# Patient Record
Sex: MALE | Race: WHITE | NOT HISPANIC OR LATINO | URBAN - METROPOLITAN AREA
[De-identification: names, ages, dates, MRNs, and addresses within clinical notes are randomized per-mention and may not be internally consistent; named-entity substitution may affect disease eponyms.]

---
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
## 2025-02-10 NOTE — ED PEDIATRIC NURSE REASSESSMENT NOTE - GASTROINTESTINAL ASSESSMENT
2/10/2025   Hakeem Delaney (: 1986) is a 38 y.o. male, New patient, here for evaluation of the following chief complaint(s):  Congestion (Pt c/o sinus congestion, vomiting, cough, headache this all started on Friday.)     ASSESSMENT/PLAN:  Below is the assessment and plan developed based on review of pertinent history, physical exam, labs, studies, and medications.       Assessment & Plan  COVID-19  The exam did not reveal an ill appearing patient, a toxic appearing patient, respiratory distress, rash or dehydration.  The exam did not reveal altered mental status, listlessness or lethargy.      The patient presents with clinical picture consistent with COVID.  There is no evidence for more malignant underlying problems such as pneumonia or sepsis.    The patient understands the possibility of COVID-associated complications including pneumonia and will follow up immediately with any worsening symptoms or changes.    The patient is a good candidate for outpatient therapy based on normal PO intake, reassuring exam with clear lungs on auscultation, normal oxygen saturations and lack of respiratory distress upon discharge.    Discharge decision based on the following:  clinical impression is consistent with outpatient treatment, patient's exam is stable and patient's condition is stable.    -Provided reassurance and reassessment  -Discussed over-the-counter medications for symptomatic relief  -Provided educational material and patient instructions  -Discharged patient with instructions to follow up with PCP  -Advised to go immediately to the ED for worsening or persistent symptoms     Discharge decision based on the following:  clinical impression is consistent with outpatient treatment; patient's exam is stable, patient's condition is stable.  Patient agrees to go to the ER if symptoms worsen in any way, or develops any new symptoms.   Flu-like symptoms  Orders:    POCT Influenza A/B Antigen    POCT COVID-19, 
WDL
WDL